# Patient Record
Sex: MALE | URBAN - METROPOLITAN AREA
[De-identification: names, ages, dates, MRNs, and addresses within clinical notes are randomized per-mention and may not be internally consistent; named-entity substitution may affect disease eponyms.]

---

## 2021-01-06 ENCOUNTER — INPATIENT (INPATIENT)
Facility: HOSPITAL | Age: 46
LOS: 0 days | Discharge: LEFT BEFORE DISCHARGE | DRG: 603 | End: 2021-01-07
Attending: STUDENT IN AN ORGANIZED HEALTH CARE EDUCATION/TRAINING PROGRAM | Admitting: STUDENT IN AN ORGANIZED HEALTH CARE EDUCATION/TRAINING PROGRAM
Payer: COMMERCIAL

## 2021-01-06 VITALS
HEART RATE: 75 BPM | SYSTOLIC BLOOD PRESSURE: 157 MMHG | OXYGEN SATURATION: 96 % | HEIGHT: 73 IN | DIASTOLIC BLOOD PRESSURE: 69 MMHG | TEMPERATURE: 99 F | RESPIRATION RATE: 18 BRPM | WEIGHT: 154.98 LBS

## 2021-01-06 VITALS — TEMPERATURE: 100 F

## 2021-01-06 DIAGNOSIS — L03.115 CELLULITIS OF RIGHT LOWER LIMB: ICD-10-CM

## 2021-01-06 DIAGNOSIS — F14.10 COCAINE ABUSE, UNCOMPLICATED: ICD-10-CM

## 2021-01-06 DIAGNOSIS — R63.8 OTHER SYMPTOMS AND SIGNS CONCERNING FOOD AND FLUID INTAKE: ICD-10-CM

## 2021-01-06 DIAGNOSIS — F25.9 SCHIZOAFFECTIVE DISORDER, UNSPECIFIED: ICD-10-CM

## 2021-01-06 DIAGNOSIS — F11.20 OPIOID DEPENDENCE, UNCOMPLICATED: ICD-10-CM

## 2021-01-06 DIAGNOSIS — F19.10 OTHER PSYCHOACTIVE SUBSTANCE ABUSE, UNCOMPLICATED: ICD-10-CM

## 2021-01-06 DIAGNOSIS — M86.371 CHRONIC MULTIFOCAL OSTEOMYELITIS, RIGHT ANKLE AND FOOT: ICD-10-CM

## 2021-01-06 LAB
ALBUMIN SERPL ELPH-MCNC: 3 G/DL — LOW (ref 3.3–5)
ALBUMIN SERPL ELPH-MCNC: 3.3 G/DL — SIGNIFICANT CHANGE UP (ref 3.3–5)
ALP SERPL-CCNC: 127 U/L — HIGH (ref 40–120)
ALP SERPL-CCNC: 134 U/L — HIGH (ref 40–120)
ALT FLD-CCNC: 86 U/L — HIGH (ref 10–45)
ALT FLD-CCNC: 91 U/L — HIGH (ref 10–45)
ANION GAP SERPL CALC-SCNC: 10 MMOL/L — SIGNIFICANT CHANGE UP (ref 5–17)
ANION GAP SERPL CALC-SCNC: 9 MMOL/L — SIGNIFICANT CHANGE UP (ref 5–17)
APPEARANCE UR: CLEAR — SIGNIFICANT CHANGE UP
APTT BLD: 39.2 SEC — HIGH (ref 27.5–35.5)
AST SERPL-CCNC: 72 U/L — HIGH (ref 10–40)
AST SERPL-CCNC: 74 U/L — HIGH (ref 10–40)
BASOPHILS # BLD AUTO: 0.04 K/UL — SIGNIFICANT CHANGE UP (ref 0–0.2)
BASOPHILS # BLD AUTO: 0.04 K/UL — SIGNIFICANT CHANGE UP (ref 0–0.2)
BASOPHILS NFR BLD AUTO: 0.4 % — SIGNIFICANT CHANGE UP (ref 0–2)
BASOPHILS NFR BLD AUTO: 0.5 % — SIGNIFICANT CHANGE UP (ref 0–2)
BILIRUB SERPL-MCNC: 0.6 MG/DL — SIGNIFICANT CHANGE UP (ref 0.2–1.2)
BILIRUB SERPL-MCNC: 0.8 MG/DL — SIGNIFICANT CHANGE UP (ref 0.2–1.2)
BILIRUB UR-MCNC: NEGATIVE — SIGNIFICANT CHANGE UP
BUN SERPL-MCNC: 10 MG/DL — SIGNIFICANT CHANGE UP (ref 7–23)
BUN SERPL-MCNC: 9 MG/DL — SIGNIFICANT CHANGE UP (ref 7–23)
CALCIUM SERPL-MCNC: 8.8 MG/DL — SIGNIFICANT CHANGE UP (ref 8.4–10.5)
CALCIUM SERPL-MCNC: 9 MG/DL — SIGNIFICANT CHANGE UP (ref 8.4–10.5)
CHLORIDE SERPL-SCNC: 92 MMOL/L — LOW (ref 96–108)
CHLORIDE SERPL-SCNC: 97 MMOL/L — SIGNIFICANT CHANGE UP (ref 96–108)
CO2 SERPL-SCNC: 29 MMOL/L — SIGNIFICANT CHANGE UP (ref 22–31)
CO2 SERPL-SCNC: 31 MMOL/L — SIGNIFICANT CHANGE UP (ref 22–31)
COLOR SPEC: YELLOW — SIGNIFICANT CHANGE UP
CREAT SERPL-MCNC: 0.8 MG/DL — SIGNIFICANT CHANGE UP (ref 0.5–1.3)
CREAT SERPL-MCNC: 0.81 MG/DL — SIGNIFICANT CHANGE UP (ref 0.5–1.3)
CRP SERPL-MCNC: 9.88 MG/DL — HIGH (ref 0–0.4)
DIFF PNL FLD: ABNORMAL
EOSINOPHIL # BLD AUTO: 0.15 K/UL — SIGNIFICANT CHANGE UP (ref 0–0.5)
EOSINOPHIL # BLD AUTO: 0.22 K/UL — SIGNIFICANT CHANGE UP (ref 0–0.5)
EOSINOPHIL NFR BLD AUTO: 1.9 % — SIGNIFICANT CHANGE UP (ref 0–6)
EOSINOPHIL NFR BLD AUTO: 2.4 % — SIGNIFICANT CHANGE UP (ref 0–6)
ERYTHROCYTE [SEDIMENTATION RATE] IN BLOOD: 89 MM/HR — HIGH
FERRITIN SERPL-MCNC: 101 NG/ML — SIGNIFICANT CHANGE UP (ref 30–400)
FOLATE SERPL-MCNC: 13.4 NG/ML — SIGNIFICANT CHANGE UP
GLUCOSE SERPL-MCNC: 79 MG/DL — SIGNIFICANT CHANGE UP (ref 70–99)
GLUCOSE SERPL-MCNC: 99 MG/DL — SIGNIFICANT CHANGE UP (ref 70–99)
GLUCOSE UR QL: NEGATIVE — SIGNIFICANT CHANGE UP
HAV IGM SER-ACNC: SIGNIFICANT CHANGE UP
HBV CORE IGM SER-ACNC: SIGNIFICANT CHANGE UP
HBV SURFACE AG SER-ACNC: SIGNIFICANT CHANGE UP
HCT VFR BLD CALC: 27.1 % — LOW (ref 39–50)
HCT VFR BLD CALC: 29 % — LOW (ref 39–50)
HCV AB S/CO SERPL IA: 19.88 S/CO — SIGNIFICANT CHANGE UP
HCV AB SERPL-IMP: REACTIVE
HGB BLD-MCNC: 8.3 G/DL — LOW (ref 13–17)
HGB BLD-MCNC: 8.8 G/DL — LOW (ref 13–17)
HIV 1+2 AB+HIV1 P24 AG SERPL QL IA: SIGNIFICANT CHANGE UP
HIV 1+2 AB+HIV1 P24 AG SERPL QL IA: SIGNIFICANT CHANGE UP
IMM GRANULOCYTES NFR BLD AUTO: 0.3 % — SIGNIFICANT CHANGE UP (ref 0–1.5)
IMM GRANULOCYTES NFR BLD AUTO: 0.4 % — SIGNIFICANT CHANGE UP (ref 0–1.5)
INR BLD: 1.22 — HIGH (ref 0.88–1.16)
IRON SATN MFR SERPL: 22 UG/DL — LOW (ref 45–165)
IRON SATN MFR SERPL: 8 % — LOW (ref 16–55)
KETONES UR-MCNC: NEGATIVE — SIGNIFICANT CHANGE UP
LACTATE SERPL-SCNC: 1.6 MMOL/L — SIGNIFICANT CHANGE UP (ref 0.5–2)
LEUKOCYTE ESTERASE UR-ACNC: NEGATIVE — SIGNIFICANT CHANGE UP
LYMPHOCYTES # BLD AUTO: 1.45 K/UL — SIGNIFICANT CHANGE UP (ref 1–3.3)
LYMPHOCYTES # BLD AUTO: 1.9 K/UL — SIGNIFICANT CHANGE UP (ref 1–3.3)
LYMPHOCYTES # BLD AUTO: 18.5 % — SIGNIFICANT CHANGE UP (ref 13–44)
LYMPHOCYTES # BLD AUTO: 20.7 % — SIGNIFICANT CHANGE UP (ref 13–44)
MAGNESIUM SERPL-MCNC: 1.5 MG/DL — LOW (ref 1.6–2.6)
MCHC RBC-ENTMCNC: 25 PG — LOW (ref 27–34)
MCHC RBC-ENTMCNC: 25.2 PG — LOW (ref 27–34)
MCHC RBC-ENTMCNC: 30.3 GM/DL — LOW (ref 32–36)
MCHC RBC-ENTMCNC: 30.6 GM/DL — LOW (ref 32–36)
MCV RBC AUTO: 82.4 FL — SIGNIFICANT CHANGE UP (ref 80–100)
MCV RBC AUTO: 82.4 FL — SIGNIFICANT CHANGE UP (ref 80–100)
MONOCYTES # BLD AUTO: 0.72 K/UL — SIGNIFICANT CHANGE UP (ref 0–0.9)
MONOCYTES # BLD AUTO: 1.11 K/UL — HIGH (ref 0–0.9)
MONOCYTES NFR BLD AUTO: 12.1 % — SIGNIFICANT CHANGE UP (ref 2–14)
MONOCYTES NFR BLD AUTO: 9.2 % — SIGNIFICANT CHANGE UP (ref 2–14)
NEUTROPHILS # BLD AUTO: 5.45 K/UL — SIGNIFICANT CHANGE UP (ref 1.8–7.4)
NEUTROPHILS # BLD AUTO: 5.86 K/UL — SIGNIFICANT CHANGE UP (ref 1.8–7.4)
NEUTROPHILS NFR BLD AUTO: 64.1 % — SIGNIFICANT CHANGE UP (ref 43–77)
NEUTROPHILS NFR BLD AUTO: 69.5 % — SIGNIFICANT CHANGE UP (ref 43–77)
NITRITE UR-MCNC: NEGATIVE — SIGNIFICANT CHANGE UP
NRBC # BLD: 0 /100 WBCS — SIGNIFICANT CHANGE UP (ref 0–0)
NRBC # BLD: 0 /100 WBCS — SIGNIFICANT CHANGE UP (ref 0–0)
PH UR: 6.5 — SIGNIFICANT CHANGE UP (ref 5–8)
PHOSPHATE SERPL-MCNC: 3.4 MG/DL — SIGNIFICANT CHANGE UP (ref 2.5–4.5)
PLATELET # BLD AUTO: 398 K/UL — SIGNIFICANT CHANGE UP (ref 150–400)
PLATELET # BLD AUTO: 468 K/UL — HIGH (ref 150–400)
POTASSIUM SERPL-MCNC: 3.6 MMOL/L — SIGNIFICANT CHANGE UP (ref 3.5–5.3)
POTASSIUM SERPL-MCNC: 3.9 MMOL/L — SIGNIFICANT CHANGE UP (ref 3.5–5.3)
POTASSIUM SERPL-SCNC: 3.6 MMOL/L — SIGNIFICANT CHANGE UP (ref 3.5–5.3)
POTASSIUM SERPL-SCNC: 3.9 MMOL/L — SIGNIFICANT CHANGE UP (ref 3.5–5.3)
PROT SERPL-MCNC: 9.1 G/DL — HIGH (ref 6–8.3)
PROT SERPL-MCNC: 9.1 G/DL — HIGH (ref 6–8.3)
PROT UR-MCNC: ABNORMAL MG/DL
PROTHROM AB SERPL-ACNC: 14.5 SEC — HIGH (ref 10.6–13.6)
RBC # BLD: 3.29 M/UL — LOW (ref 4.2–5.8)
RBC # BLD: 3.52 M/UL — LOW (ref 4.2–5.8)
RBC # FLD: 18.6 % — HIGH (ref 10.3–14.5)
RBC # FLD: 18.7 % — HIGH (ref 10.3–14.5)
SARS-COV-2 RNA SPEC QL NAA+PROBE: SIGNIFICANT CHANGE UP
SODIUM SERPL-SCNC: 133 MMOL/L — LOW (ref 135–145)
SODIUM SERPL-SCNC: 135 MMOL/L — SIGNIFICANT CHANGE UP (ref 135–145)
SP GR SPEC: 1.02 — SIGNIFICANT CHANGE UP (ref 1–1.03)
TIBC SERPL-MCNC: 291 UG/DL — SIGNIFICANT CHANGE UP (ref 220–430)
UIBC SERPL-MCNC: 269 UG/DL — SIGNIFICANT CHANGE UP (ref 110–370)
UROBILINOGEN FLD QL: 0.2 E.U./DL — SIGNIFICANT CHANGE UP
VIT B12 SERPL-MCNC: 586 PG/ML — SIGNIFICANT CHANGE UP (ref 232–1245)
WBC # BLD: 7.84 K/UL — SIGNIFICANT CHANGE UP (ref 3.8–10.5)
WBC # BLD: 9.16 K/UL — SIGNIFICANT CHANGE UP (ref 3.8–10.5)
WBC # FLD AUTO: 7.84 K/UL — SIGNIFICANT CHANGE UP (ref 3.8–10.5)
WBC # FLD AUTO: 9.16 K/UL — SIGNIFICANT CHANGE UP (ref 3.8–10.5)

## 2021-01-06 PROCEDURE — 73590 X-RAY EXAM OF LOWER LEG: CPT | Mod: 26,RT

## 2021-01-06 PROCEDURE — 93010 ELECTROCARDIOGRAM REPORT: CPT

## 2021-01-06 PROCEDURE — 73630 X-RAY EXAM OF FOOT: CPT | Mod: 26,RT,77

## 2021-01-06 PROCEDURE — 93970 EXTREMITY STUDY: CPT | Mod: 26

## 2021-01-06 PROCEDURE — 73610 X-RAY EXAM OF ANKLE: CPT | Mod: 26,RT

## 2021-01-06 PROCEDURE — 99285 EMERGENCY DEPT VISIT HI MDM: CPT

## 2021-01-06 PROCEDURE — 71045 X-RAY EXAM CHEST 1 VIEW: CPT | Mod: 26

## 2021-01-06 PROCEDURE — 99221 1ST HOSP IP/OBS SF/LOW 40: CPT

## 2021-01-06 PROCEDURE — 73630 X-RAY EXAM OF FOOT: CPT | Mod: 26,LT

## 2021-01-06 PROCEDURE — 99223 1ST HOSP IP/OBS HIGH 75: CPT | Mod: GC

## 2021-01-06 PROCEDURE — 99223 1ST HOSP IP/OBS HIGH 75: CPT

## 2021-01-06 RX ORDER — ACETAMINOPHEN 500 MG
1000 TABLET ORAL ONCE
Refills: 0 | Status: COMPLETED | OUTPATIENT
Start: 2021-01-06 | End: 2021-01-06

## 2021-01-06 RX ORDER — LAMOTRIGINE 25 MG/1
50 TABLET, ORALLY DISINTEGRATING ORAL DAILY
Refills: 0 | Status: DISCONTINUED | OUTPATIENT
Start: 2021-01-07 | End: 2021-01-07

## 2021-01-06 RX ORDER — MIRTAZAPINE 45 MG/1
15 TABLET, ORALLY DISINTEGRATING ORAL AT BEDTIME
Refills: 0 | Status: DISCONTINUED | OUTPATIENT
Start: 2021-01-06 | End: 2021-01-07

## 2021-01-06 RX ORDER — VANCOMYCIN HCL 1 G
1250 VIAL (EA) INTRAVENOUS ONCE
Refills: 0 | Status: COMPLETED | OUTPATIENT
Start: 2021-01-06 | End: 2021-01-06

## 2021-01-06 RX ORDER — CEFEPIME 1 G/1
2000 INJECTION, POWDER, FOR SOLUTION INTRAMUSCULAR; INTRAVENOUS EVERY 8 HOURS
Refills: 0 | Status: DISCONTINUED | OUTPATIENT
Start: 2021-01-06 | End: 2021-01-06

## 2021-01-06 RX ORDER — ENOXAPARIN SODIUM 100 MG/ML
40 INJECTION SUBCUTANEOUS EVERY 24 HOURS
Refills: 0 | Status: DISCONTINUED | OUTPATIENT
Start: 2021-01-06 | End: 2021-01-07

## 2021-01-06 RX ORDER — GABAPENTIN 400 MG/1
300 CAPSULE ORAL DAILY
Refills: 0 | Status: DISCONTINUED | OUTPATIENT
Start: 2021-01-06 | End: 2021-01-07

## 2021-01-06 RX ORDER — VANCOMYCIN HCL 1 G
1250 VIAL (EA) INTRAVENOUS EVERY 12 HOURS
Refills: 0 | Status: DISCONTINUED | OUTPATIENT
Start: 2021-01-06 | End: 2021-01-06

## 2021-01-06 RX ORDER — CEFEPIME 1 G/1
2000 INJECTION, POWDER, FOR SOLUTION INTRAMUSCULAR; INTRAVENOUS ONCE
Refills: 0 | Status: COMPLETED | OUTPATIENT
Start: 2021-01-06 | End: 2021-01-06

## 2021-01-06 RX ORDER — KETOROLAC TROMETHAMINE 30 MG/ML
30 SYRINGE (ML) INJECTION ONCE
Refills: 0 | Status: DISCONTINUED | OUTPATIENT
Start: 2021-01-06 | End: 2021-01-06

## 2021-01-06 RX ORDER — CEFAZOLIN SODIUM 1 G
2000 VIAL (EA) INJECTION EVERY 8 HOURS
Refills: 0 | Status: DISCONTINUED | OUTPATIENT
Start: 2021-01-06 | End: 2021-01-07

## 2021-01-06 RX ORDER — ACETAMINOPHEN 500 MG
650 TABLET ORAL EVERY 6 HOURS
Refills: 0 | Status: DISCONTINUED | OUTPATIENT
Start: 2021-01-06 | End: 2021-01-07

## 2021-01-06 RX ORDER — BUPROPION HYDROCHLORIDE 150 MG/1
150 TABLET, EXTENDED RELEASE ORAL DAILY
Refills: 0 | Status: DISCONTINUED | OUTPATIENT
Start: 2021-01-06 | End: 2021-01-07

## 2021-01-06 RX ORDER — LAMOTRIGINE 25 MG/1
25 TABLET, ORALLY DISINTEGRATING ORAL DAILY
Refills: 0 | Status: DISCONTINUED | OUTPATIENT
Start: 2021-01-06 | End: 2021-01-06

## 2021-01-06 RX ORDER — VANCOMYCIN HCL 1 G
1250 VIAL (EA) INTRAVENOUS EVERY 8 HOURS
Refills: 0 | Status: DISCONTINUED | OUTPATIENT
Start: 2021-01-06 | End: 2021-01-06

## 2021-01-06 RX ADMIN — ENOXAPARIN SODIUM 40 MILLIGRAM(S): 100 INJECTION SUBCUTANEOUS at 09:46

## 2021-01-06 RX ADMIN — Medication 1000 MILLIGRAM(S): at 02:40

## 2021-01-06 RX ADMIN — Medication 1250 MILLIGRAM(S): at 05:00

## 2021-01-06 RX ADMIN — Medication 100 MILLIGRAM(S): at 21:42

## 2021-01-06 RX ADMIN — CEFEPIME 2000 MILLIGRAM(S): 1 INJECTION, POWDER, FOR SOLUTION INTRAMUSCULAR; INTRAVENOUS at 05:43

## 2021-01-06 RX ADMIN — LAMOTRIGINE 25 MILLIGRAM(S): 25 TABLET, ORALLY DISINTEGRATING ORAL at 13:21

## 2021-01-06 RX ADMIN — Medication 1000 MILLIGRAM(S): at 03:16

## 2021-01-06 RX ADMIN — MIRTAZAPINE 15 MILLIGRAM(S): 45 TABLET, ORALLY DISINTEGRATING ORAL at 21:42

## 2021-01-06 RX ADMIN — Medication 30 MILLIGRAM(S): at 03:16

## 2021-01-06 RX ADMIN — CEFEPIME 100 MILLIGRAM(S): 1 INJECTION, POWDER, FOR SOLUTION INTRAMUSCULAR; INTRAVENOUS at 04:52

## 2021-01-06 RX ADMIN — Medication 400 MILLIGRAM(S): at 01:37

## 2021-01-06 RX ADMIN — Medication 166.67 MILLIGRAM(S): at 13:21

## 2021-01-06 RX ADMIN — BUPROPION HYDROCHLORIDE 150 MILLIGRAM(S): 150 TABLET, EXTENDED RELEASE ORAL at 13:21

## 2021-01-06 RX ADMIN — Medication 650 MILLIGRAM(S): at 21:42

## 2021-01-06 RX ADMIN — GABAPENTIN 300 MILLIGRAM(S): 400 CAPSULE ORAL at 13:21

## 2021-01-06 RX ADMIN — Medication 166.67 MILLIGRAM(S): at 03:16

## 2021-01-06 RX ADMIN — Medication 30 MILLIGRAM(S): at 01:37

## 2021-01-06 RX ADMIN — CEFEPIME 100 MILLIGRAM(S): 1 INJECTION, POWDER, FOR SOLUTION INTRAMUSCULAR; INTRAVENOUS at 13:21

## 2021-01-06 RX ADMIN — Medication 0.1 MILLIGRAM(S): at 17:47

## 2021-01-06 NOTE — CHART NOTE - NSCHARTNOTEFT_GEN_A_CORE
Infectious Diseases Anti-infective Approval Note    Medication: cefepime  Dose: 2g  Route: IV  Frequency: q8h  Duration: 3 days    *THIS IS NOT AN INFECTIOUS DISEASES CONSULTATION*

## 2021-01-06 NOTE — H&P ADULT - PROBLEM SELECTOR PLAN 3
Current IVDA. Last used IV heroine and cocaine yesterday. Uses same distributor, obtains sterile needles, does not share needles.

## 2021-01-06 NOTE — ED ADULT NURSE NOTE - NSIMPLEMENTINTERV_GEN_ALL_ED
Implemented All Fall Risk Interventions:  [place] to call system. Call bell, personal items and telephone within reach. Instruct patient to call for assistance. Room bathroom lighting operational. Non-slip footwear when patient is off stretcher. Physically safe environment: no spills, clutter or unnecessary equipment. Stretcher in lowest position, wheels locked, appropriate side rails in place. Provide visual cue, wrist band, yellow gown, etc. Monitor gait and stability. Monitor for mental status changes and reorient to person, place, and time. Review medications for side effects contributing to fall risk. Reinforce activity limits and safety measures with patient and family.

## 2021-01-06 NOTE — H&P ADULT - NSHPSOCIALHISTORY_GEN_ALL_CORE
Living situation:  Occupation:    Tobacco use:   EtOH use:  Illicit drug use:    Sexual History: Living situation: lives in apartment alone  Occupation: unemployed due to osteo (previously worked as [employer])    Tobacco use: 2-5 cigarettes per day  EtOH use: none  Illicit drug use: IVDA, cocaine and heroine daily use

## 2021-01-06 NOTE — H&P ADULT - NSHPLABSRESULTS_GEN_ALL_CORE
LABS:                        8.3    7.84  )-----------( 398      ( 2021 01:22 )             27.1         133<L>  |  92<L>  |  10  ----------------------------<  99  3.6   |  31  |  0.81    Ca    9.0      2021 01:22    TPro  9.1<H>  /  Alb  3.3  /  TBili  0.6  /  DBili  x   /  AST  72<H>  /  ALT  91<H>  /  AlkPhos  134<H>        PT/INR - ( 2021 01:22 )   PT: 14.5 sec;   INR: 1.22     PTT - ( 2021 01:22 )  PTT:39.2 sec    Urinalysis Basic - ( 2021 03:13 )  Color: Yellow / Appearance: Clear / S.025 / pH: x  Gluc: x / Ketone: NEGATIVE  / Bili: Negative / Urobili: 0.2 E.U./dL   Blood: x / Protein: Trace mg/dL / Nitrite: NEGATIVE   Leuk Esterase: NEGATIVE / RBC: 5-10 /HPF / WBC < 5 /HPF   Sq Epi: x / Non Sq Epi: 0-5 /HPF / Bacteria: Present /HPF     RADIOLOGY, EKG AND ADDITIONAL TESTS: Reviewed. LABS:                        8.3    7.84  )-----------( 398      ( 2021 01:22 )             27.1         133<L>  |  92<L>  |  10  ----------------------------<  99  3.6   |  31  |  0.81    Ca    9.0      2021 01:22    TPro  9.1<H>  /  Alb  3.3  /  TBili  0.6  /  DBili  x   /  AST  72<H>  /  ALT  91<H>  /  AlkPhos  134<H>        PT/INR - ( 2021 01:22 )   PT: 14.5 sec;   INR: 1.22     PTT - ( 2021 01:22 )  PTT:39.2 sec    Urinalysis Basic - ( 2021 03:13 )  Color: Yellow / Appearance: Clear / S.025 / pH: x  Gluc: x / Ketone: NEGATIVE  / Bili: Negative / Urobili: 0.2 E.U./dL   Blood: x / Protein: Trace mg/dL / Nitrite: NEGATIVE   Leuk Esterase: NEGATIVE / RBC: 5-10 /HPF / WBC < 5 /HPF   Sq Epi: x / Non Sq Epi: 0-5 /HPF / Bacteria: Present /HPF     RADIOLOGY, EKG AND ADDITIONAL TESTS: Reviewed.  < from: Xray Chest 1 View AP/PA (21 @ 04:56) >  Findings/impression: Heart, lungs, mediastinum and thorax are unremarkable.    < from: Xray Tibia + Fibula 2 Views, Right (21 @ 04:55) >  Findings/impression: Tibia and fibula are unremarkable. Ankle and foot soft tissue swelling, ankle joint effusion, talar pathological fracture/osteomyelitis    < from: Xray Foot AP + Lateral + Oblique, Right (21 @ 04:55) >  Findings/impression: Diffuse soft tissue swelling, demineralization, anklejoint effusion.. Talar pathological fracture/osteomyelitis.    < from: US Duplex Venous Lower Ext Complete, Bilateral (21 @ 02:51) >  FINDINGS:     Thigh veins: The common femoral, femoral, popliteal, proximal greater saphenous, and proximal deep femoral veins are patent and free of thrombus bilaterally. The veins are normally compressible and have normal phasic flow and augmentation response.     Calf veins: The paired peroneal and posterior tibial calf veins are patent bilaterally.     Other: There is bilateral lower extremity subcutaneous edema, right worse than left. Multiple mildly prominent lymph nodes are seen in the right groin measuring up to 1.4 cm, nonspecific.  IMPRESSION: No deep vein thrombosis seen.

## 2021-01-06 NOTE — CONSULT NOTE ADULT - SUBJECTIVE AND OBJECTIVE BOX
Orthopaedic Surgery Consult Note    For Surgeon: Dr. Ma       HPI:  History obtained from chart and pt as pt poor historian.     45m PMHx of schizoaffective disorder, IVDA, and hx of R LE cellulitis & osteomyelitis, presented to [facility] with worsening pain and swelling of R leg in the setting of recent I&D for abscess/osteomyelitis at an outside institution w nonadherence to oral abx .   As Per prior notes pt with a recent admission to [facility] 12/11/20-12/16/20 with foot abscess/osteo, s/p I&D 12/11  however pt states this was sevral months ago/going on for several months???. As per charts, Initially on vancomycin for S. aureus then switched to clindamycin after sensitivities resulted. Pt then signed out AMA and was discharged on doxycycline, however he stopped 4d into course due to diarrhea - however he states he has been on clinda and has been compliant however difficult to asses truth of story due to lethargy while talking/examining pt.     pt endorses continued IVDU. Last used IV heroine and cocaine yesterday.    An outpatient  MR showing osteo involving the distal tibia, distal fibula, talus, articular aspect and anterior third of the calcaneum, navicular, articular aspects of the cuboid, medial, intermediate and lateral cuneiforms, with pathological fracture about the talar dome, and large joint effusion with enhancing peripheral synovitis.    Today   In the ED,  Vitals: T98.9, HR 75, /69, RR 18, O2sat 96%  Labs: Hb 8.3 | Na 133, Cl 92, total protein 9.1 | alk phos 134, AST 72, ALT 91 | ESR 89, CRP 9.88 | lactate 1.6    UA: large blood, RBC 5-10  Imaging:    CXR: Heart, lungs, mediastinum and thorax are unremarkable.    XR Tibia + Fibula: Tibia and fibula are unremarkable. Ankle and foot soft tissue swelling, ankle joint effusion, talar pathological fracture/osteomyelitis    XR Foot AP + Lateral + Oblique, Right: Diffuse soft tissue swelling, demineralization, anklejoint effusion.. Talar pathological fracture/osteomyelitis.    US Duplex Venous Lower Ext Complete, Bilateral: neg for DVT  Interventions: given Tylenol 1g qd, toradol 30mg, vancomycin 1250mg, cefepime 2g (06 Jan 2021 05:34)      Allergies    Haldol (Unknown)  penicillin (Unknown)  Seafood (Unknown)    Intolerances      PAST MEDICAL & SURGICAL HISTORY:  IV drug abuse    Schizoaffective disorder    No significant past surgical history      MEDICATIONS  (STANDING):  buPROPion XL . 150 milliGRAM(s) Oral daily  ceFAZolin   IVPB 2000 milliGRAM(s) IV Intermittent every 8 hours  cloNIDine 0.1 milliGRAM(s) Oral every 12 hours  enoxaparin Injectable 40 milliGRAM(s) SubCutaneous every 24 hours  gabapentin 300 milliGRAM(s) Oral daily  mirtazapine 15 milliGRAM(s) Oral at bedtime    MEDICATIONS  (PRN):  acetaminophen   Tablet .. 650 milliGRAM(s) Oral every 6 hours PRN Temp greater or equal to 38C (100.4F), Mild Pain (1 - 3)  cloNIDine 0.1 milliGRAM(s) Oral once PRN withdrawal symptoms      Vital Signs Last 24 Hrs  T(C): 37.6 (06 Jan 2021 13:35), Max: 37.6 (06 Jan 2021 13:35)  T(F): 99.7 (06 Jan 2021 13:35), Max: 99.7 (06 Jan 2021 13:35)  HR: 89 (06 Jan 2021 13:35) (75 - 89)  BP: 112/64 (06 Jan 2021 13:35) (112/64 - 157/69)  BP(mean): --  RR: 17 (06 Jan 2021 13:35) (15 - 18)  SpO2: 98% (06 Jan 2021 13:35) (96% - 99%)    Physical Exam:                          8.8    9.16  )-----------( 468      ( 06 Jan 2021 11:10 )             29.0     01-06    135  |  97  |  9   ----------------------------<  79  3.9   |  29  |  0.80    Ca    8.8      06 Jan 2021 11:10  Phos  3.4     01-06  Mg     1.5     01-06    TPro  9.1<H>  /  Alb  3.0<L>  /  TBili  0.8  /  DBili  x   /  AST  74<H>  /  ALT  86<H>  /  AlkPhos  127<H>  01-06    PT/INR - ( 06 Jan 2021 01:22 )   PT: 14.5 sec;   INR: 1.22          PTT - ( 06 Jan 2021 01:22 )  PTT:39.2 sec  Imaging:   XR showing significant soft tissue swelling   acute on chronic changes over multiple bones in the foot an ankle possible representing osteomyelitis      A/P: 45yMale w a complex history as well as noted/documented non-compliance with treatment presenting after R ankle/foot I&D at an outside hospital w continued pain, swelling and erythema non-compliant w prescribed abx admitted to medicine for cellulitis. Podiatry and ID on following recommending MR of ankle/foot to evaluate soft tissues and bones further as well as possible abscess. Agree with podiatry, will follow up MR. Due to non-septic/unstable status of patient, Erythema throughout region where aspiration would occur and abx treatment aspiration will be deferred pending MR results. Will continue to follow, Podiatry recs appropriate. If any changes in status of pt please notify ortho.   - No acute orthopaedic intervention at this point  - Podiatry recs appropriate at this time, will follow behind podiatry   - ID recs for Abx  - MR w and w/o contrast of distal leg, ankle and foot.   - pain control  - rest of care as per primary   - will re-eval for aspiration pending MR, or change in status.   - please notify ortho with any new information/ change in status     -Discussed with Dr. Ma     Ortho Pager [phone]   Orthopaedic Surgery Consult Note    For Surgeon: Dr. Ma       HPI:  History obtained from chart and pt as pt poor historian.     45m PMHx of schizoaffective disorder, IVDA, and hx of R LE cellulitis & osteomyelitis, presented to [facility] with worsening pain and swelling of R leg in the setting of recent I&D for abscess/osteomyelitis at an outside institution w nonadherence to oral abx .   As Per prior notes pt with a recent admission to [facility] 12/11/20-12/16/20 with foot abscess/osteo, s/p I&D 12/11  however pt states this was sevral months ago/going on for several months???. As per charts, Initially on vancomycin for S. aureus then switched to clindamycin after sensitivities resulted. Pt then signed out AMA and was discharged on doxycycline, however he stopped 4d into course due to diarrhea - however he states he has been on clinda and has been compliant however difficult to asses truth of story due to lethargy while talking/examining pt.     pt endorses continued IVDU. Last used IV heroine and cocaine yesterday.    An outpatient  MR showing osteo involving the distal tibia, distal fibula, talus, articular aspect and anterior third of the calcaneum, navicular, articular aspects of the cuboid, medial, intermediate and lateral cuneiforms, with pathological fracture about the talar dome, and large joint effusion with enhancing peripheral synovitis.    Today   In the ED,  Vitals: T98.9, HR 75, /69, RR 18, O2sat 96%  Labs: Hb 8.3 | Na 133, Cl 92, total protein 9.1 | alk phos 134, AST 72, ALT 91 | ESR 89, CRP 9.88 | lactate 1.6    UA: large blood, RBC 5-10  Imaging:    CXR: Heart, lungs, mediastinum and thorax are unremarkable.    XR Tibia + Fibula: Tibia and fibula are unremarkable. Ankle and foot soft tissue swelling, ankle joint effusion, talar pathological fracture/osteomyelitis    XR Foot AP + Lateral + Oblique, Right: Diffuse soft tissue swelling, demineralization, anklejoint effusion.. Talar pathological fracture/osteomyelitis.    US Duplex Venous Lower Ext Complete, Bilateral: neg for DVT  Interventions: given Tylenol 1g qd, toradol 30mg, vancomycin 1250mg, cefepime 2g (06 Jan 2021 05:34)      Allergies    Haldol (Unknown)  penicillin (Unknown)  Seafood (Unknown)    Intolerances      PAST MEDICAL & SURGICAL HISTORY:  IV drug abuse    Schizoaffective disorder    No significant past surgical history      MEDICATIONS  (STANDING):  buPROPion XL . 150 milliGRAM(s) Oral daily  ceFAZolin   IVPB 2000 milliGRAM(s) IV Intermittent every 8 hours  cloNIDine 0.1 milliGRAM(s) Oral every 12 hours  enoxaparin Injectable 40 milliGRAM(s) SubCutaneous every 24 hours  gabapentin 300 milliGRAM(s) Oral daily  mirtazapine 15 milliGRAM(s) Oral at bedtime    MEDICATIONS  (PRN):  acetaminophen   Tablet .. 650 milliGRAM(s) Oral every 6 hours PRN Temp greater or equal to 38C (100.4F), Mild Pain (1 - 3)  cloNIDine 0.1 milliGRAM(s) Oral once PRN withdrawal symptoms      Vital Signs Last 24 Hrs  T(C): 37.6 (06 Jan 2021 13:35), Max: 37.6 (06 Jan 2021 13:35)  T(F): 99.7 (06 Jan 2021 13:35), Max: 99.7 (06 Jan 2021 13:35)  HR: 89 (06 Jan 2021 13:35) (75 - 89)  BP: 112/64 (06 Jan 2021 13:35) (112/64 - 157/69)  BP(mean): --  RR: 17 (06 Jan 2021 13:35) (15 - 18)  SpO2: 98% (06 Jan 2021 13:35) (96% - 99%)    Physical Exam:  general: lethargic       exam limited due to pt not allowing motion     RLE:   swelling   erythema   no open wounds, no drainage over old lateral incision   SILT   2+ Cap refill  firing EHl/FHL/TA/GS                      8.8    9.16  )-----------( 468      ( 06 Jan 2021 11:10 )             29.0     01-06    135  |  97  |  9   ----------------------------<  79  3.9   |  29  |  0.80    Ca    8.8      06 Jan 2021 11:10  Phos  3.4     01-06  Mg     1.5     01-06    TPro  9.1<H>  /  Alb  3.0<L>  /  TBili  0.8  /  DBili  x   /  AST  74<H>  /  ALT  86<H>  /  AlkPhos  127<H>  01-06    PT/INR - ( 06 Jan 2021 01:22 )   PT: 14.5 sec;   INR: 1.22          PTT - ( 06 Jan 2021 01:22 )  PTT:39.2 sec  Imaging:   XR showing significant soft tissue swelling   acute on chronic changes over multiple bones in the foot an ankle possible representing osteomyelitis      A/P: 45yMale w a complex history as well as noted/documented non-compliance with treatment presenting after R ankle/foot I&D at an outside hospital w continued pain, swelling and erythema non-compliant w prescribed abx admitted to medicine for cellulitis. Podiatry and ID on following recommending MR of ankle/foot to evaluate soft tissues and bones further as well as possible abscess. Agree with podiatry, will follow up MR. Due to non-septic/unstable status of patient, Erythema throughout region where aspiration would occur and abx treatment aspiration will be deferred pending MR results. Will continue to follow, Podiatry recs appropriate. If any changes in status of pt please notify ortho.   - No acute orthopaedic intervention at this point  - Podiatry recs appropriate at this time, will follow behind podiatry   - ID recs for Abx  - MR w and w/o contrast of distal leg, ankle and foot.   - pain control  - rest of care as per primary   - will re-eval for aspiration pending MR, or change in status.   - please notify ortho with any new information/ change in status     -Discussed with Dr. Ma     Ortho Pager [phone]

## 2021-01-06 NOTE — H&P ADULT - ATTENDING COMMENTS
45 year old man with osteomyelitis of the right foot (diagnosed while at [facility] last month), now presenting with pain and swelling of the right foot.     # Multifocal osteomyelitis of right leg  # Cellulitis of right lower limb  MRI from [facility] stay (12/11-12/16) showing: osteomyelitis of the distal tibia, distal fibula, talus, articular aspect and anterior third of the calcaneum, navicular, articular aspects of the cuboid, medial, intermediate and lateral cuneiform  Swelling and erythema of right foot and ankle, consistent with cellulitis.   Started on abx in the ED  Agree with ID recs for Cefazolin based on sensitivities from patient’s 12/11-12/16 hospitalization at [facility]. If does not respond, may have to broaden, given that it has been >2 weeks since he left [facility]  -f/u MRI, podiatry recs, ID recs  Has effusion and swelling of the right ankle, with very limited range of motion.   [ ] ortho to evaluate for possible septic arthritis.     # Left leg edema  Swelling without erythema. Patient attributes this to increased pressure and weight on left foot while using crutches. pain in midfoot with point tenderness in dorsum of foot around the navicular bone  [ ] f/u foot and ankle x-rays    # Opioid dependence   # Cocaine dependence  Last used heroin and cocaine the day before admission. Almost daily use of both  Used suboxone before but did not like taste; re-visit willingness to restart suboxone when patient is more alert.     # Schizoaffective disorder  Home meds per psych recs

## 2021-01-06 NOTE — ED PROVIDER NOTE - CONSTITUTIONAL, MLM
ill appearing, awake, alert, oriented to person, place, time/situation and appears uncomfortable normal...

## 2021-01-06 NOTE — ED PROVIDER NOTE - CLINICAL SUMMARY MEDICAL DECISION MAKING FREE TEXT BOX
has discharge papers from outside hospital detailing recent admission for cellulitis/osteomyelitis of right lower extremity with incomplete treatment, signed out ama, didn't followup. now with persistent worsening pain/swelling. appears infected. labs/cultures obtained. xrays done showing bony destruction of ankle/foot. started cefepimem/vancomycin for suspected osteo/cellulitis. duplex done and neg for dvt. toradol/tylenol for pain. podiatry consult. admit for further management

## 2021-01-06 NOTE — CONSULT NOTE ADULT - SUBJECTIVE AND OBJECTIVE BOX
Attending: Adalbertonancy    Patient is a 45y old  Male who presents with a chief complaint of RLE osteomyelitis (06 Jan 2021 08:09)    HPI:  Mr. Ty is a 45M with schizoaffective disorder, IVDA, and hx of R LE cellulitis & osteomyelitis, who presents to [facility] for worsening pain and swelling of R leg.     Pt with recent admission to [facility] 12/11/20-12/16/20 with foot abscess/osteo, s/p I&D 12/11. MRI w/ osteo involving the distal tibia, distal fibula, talus, articular aspect and anterior third of the calcaneum, navicular, articular aspects of the cuboid, medial, intermediate and lateral cuneiforms, with pathological fracture about the talar dome, and large joint effusion with enhancing peripheral synovitis. Initially on vancomycin for S. aureus, switched to clindamycin after sensitivities resulted. Pt signed out AMA and was discharged on doxycycline, however he stopped 4d into course due to diarrhea. Pt was supposed to follow up 12/29 but didn't arrive at appointment.    Today he reports coming in due to worsening pain and swelling of both feet. He reports that the swelling worsened in the L foot due to increased pressure and weight on that side while using crutches.    Of note, pt with current IVDA. Last used IV heroine and cocaine yesterday. Uses same distributor, obtains sterile needles, does not share needles.    Today   In the ED,  Vitals: T98.9, HR 75, /69, RR 18, O2sat 96%  Labs: Hb 8.3 | Na 133, Cl 92, total protein 9.1 | alk phos 134, AST 72, ALT 91 | ESR 89, CRP 9.88 | lactate 1.6    UA: large blood, RBC 5-10  Imaging:    CXR: Heart, lungs, mediastinum and thorax are unremarkable.    XR Tibia + Fibula: Tibia and fibula are unremarkable. Ankle and foot soft tissue swelling, ankle joint effusion, talar pathological fracture/osteomyelitis    XR Foot AP + Lateral + Oblique, Right: Diffuse soft tissue swelling, demineralization, anklejoint effusion.. Talar pathological fracture/osteomyelitis.    US Duplex Venous Lower Ext Complete, Bilateral: neg for DVT  Interventions: given Tylenol 1g qd, toradol 30mg, vancomycin 1250mg, cefepime 2g (06 Jan 2021 05:34)    Review of systems negative except per HPI    PAST MEDICAL & SURGICAL HISTORY:  IV drug abuse    Schizoaffective disorder    No significant past surgical history      Home Medications:    Allergies    Haldol (Unknown)  penicillin (Unknown)  Seafood (Unknown)    Intolerances      FAMILY HISTORY:    Social History:       LABS                        8.3    7.84  )-----------( 398      ( 06 Jan 2021 01:22 )             27.1     01-06    133<L>  |  92<L>  |  10  ----------------------------<  99  3.6   |  31  |  0.81    Ca    9.0      06 Jan 2021 01:22    TPro  9.1<H>  /  Alb  3.3  /  TBili  0.6  /  DBili  x   /  AST  72<H>  /  ALT  91<H>  /  AlkPhos  134<H>  01-06    PT/INR - ( 06 Jan 2021 01:22 )   PT: 14.5 sec;   INR: 1.22          PTT - ( 06 Jan 2021 01:22 )  PTT:39.2 sec  ESR: 89  CRP: --  01-06 @ 01:22    Vital Signs Last 24 Hrs  T(C): 37 (06 Jan 2021 07:20), Max: 37.2 (06 Jan 2021 00:05)  T(F): 98.6 (06 Jan 2021 07:20), Max: 98.9 (06 Jan 2021 00:05)  HR: 80 (06 Jan 2021 07:20) (75 - 89)  BP: 121/55 (06 Jan 2021 07:20) (114/64 - 157/69)  BP(mean): --  RR: 18 (06 Jan 2021 07:20) (15 - 18)  SpO2: 99% (06 Jan 2021 07:20) (96% - 99%)    PHYSICAL EXAM  General: NAD, AA0x3    Lower Extremity Focused:  Vasc: DP/PT 2/4 B/L  Derm: R lateral ankle wound (1x1cm). No purulence or palpable fluctuance noted. Significant edema of RLE>>LLE.  Neuro: Sensation grossly intact    RADIOLOGY  < from: Xray Tibia + Fibula 2 Views, Right (01.06.21 @ 04:55) >  Findings/  impression: Tibia and fibula are unremarkable. Ankle and foot soft tissue swelling, ankle joint effusion, talar pathological fracture/osteomyelitis    < end of copied text >    < from: Xray Foot AP + Lateral + Oblique, Right (01.06.21 @ 04:55) >  Findings/  impression: Diffuse soft tissue swelling, demineralization, anklejoint effusion.. Talar pathological fracture/osteomyelitis.    < end of copied text >

## 2021-01-06 NOTE — ED PROVIDER NOTE - OBJECTIVE STATEMENT
history of schizoaffective disorder, ivda, right leg cellulitis/abscess/osteomyelitis, here with increased pain/swelling of right leg/foot. Was reportedly admitted at [facility] 12/11/20-12/16/20 with foot abscess/osteo. Had I and d done on 12/11, started vancomycin, switched to clindamycin for S. Aureus infection. MRI done showing osteo of distal tibia/fibula/talus/foot bones. Per papers, pt then signed out ama. Was discharged on doxycycline but he says he was taking Clindamycin po. Not tolerating due to diarrhea. Supposed to followup 12/29 but didn't. history of schizoaffective disorder, ivda, right leg cellulitis/abscess/osteomyelitis, here with increased pain/swelling of right leg/foot. Was reportedly admitted at [facility] 12/11/20-12/16/20 with foot abscess/osteo. Had I and d done on 12/11, started vancomycin, switched to clindamycin for S. Aureus infection. MRI done showing osteo of distal tibia/fibula/talus/foot bones. Per papers, pt then signed out ama. Was discharged on doxycycline but he says he was taking Clindamycin po. Not tolerating due to diarrhea so he stopped after a few days and hasn't taken anything in a few weeks. No diarrhea in past few weeks. Supposed to followup 12/29 but didn't. Last used iv drug yesterday.

## 2021-01-06 NOTE — BEHAVIORAL HEALTH ASSESSMENT NOTE - SUMMARY
45M with PPH of mood disorder vs schizoaffective disorder and chronic IVDA (heroin. cocaine), and PMH of R LE cellulitis & osteomyelitis, who presents to [facility] for worsening pain and swelling of R leg, admitted for acute-on-chronic multifocal osteomyelitis and worsening cellulitis. Psychiatry was consulted to assist with providing psychoeducation re the benefits of taking suboxone. Patient currently presents sedated and unable/unwilling to engage in a discussion about the benefits of starting suboxone. 45M with PPH of mood disorder vs schizoaffective disorder and chronic IVDA (heroin. cocaine), and PMH of R LE cellulitis & osteomyelitis, who presents to [facility] for worsening pain and swelling of R leg, admitted for acute-on-chronic multifocal osteomyelitis and worsening cellulitis. Psychiatry was consulted to assist with providing psychoeducation re the benefits of taking suboxone. Patient currently presents sedated and unable/unwilling to engage in a discussion about the benefits of starting suboxone, however he agrees to discuss again tomorrow.   Plan:  -will continue to meet with the patient to provide psychoeducation re the benefits of starting suboxone  -consider starting clonidine 0.1mg q12h for symptomatic management of opioid withdrawal  -continue home remeron 15mg qd, wellbutrin 150mg qd, Neurontin 300 qd, lamictal 25mg qd.

## 2021-01-06 NOTE — ED PROVIDER NOTE - MUSCULOSKELETAL, MLM
right leg 3+ edema, warm, redness, open sore lateral malleolus without drainage but lower leg erythematous, tender to touch. left leg 2+ edema, no redness/warmth. pedal pulses 1+. track marks right arm do not appear infected

## 2021-01-06 NOTE — H&P ADULT - ASSESSMENT
Mr. Ty is a 45M with schizoaffective disorder, IVDA, and hx of R LE cellulitis & osteomyelitis, who presents to [facility] for worsening pain and swelling of R leg, admitted for acute-on-chronic multifocal osteomyelitis and worsening cellulitis.

## 2021-01-06 NOTE — H&P ADULT - PROBLEM SELECTOR PLAN 4
Pt with schizoaffective disorder, on remeron 15mg qd, wellbutrin 150mg qd, Neurontin 300 qd, lamictal 25mg qd.    -C/w home medications

## 2021-01-06 NOTE — BEHAVIORAL HEALTH ASSESSMENT NOTE - HPI (INCLUDE ILLNESS QUALITY, SEVERITY, DURATION, TIMING, CONTEXT, MODIFYING FACTORS, ASSOCIATED SIGNS AND SYMPTOMS)
Patient is a 45M with PPH of mood disorder vs schizoaffective disorder, chronic IVDA (heroin. cocaine), and hx of R LE cellulitis & osteomyelitis, who presents to [facility] for worsening pain and swelling of R leg, admitted for acute-on-chronic multifocal osteomyelitis and worsening cellulitis. Psychiatry was consulted to assist with providing psychoeducation re the benefits of taking suboxone. Per the primary team, patient expressed ambivalence about starting suboxone due to having side effects with prior trials and expressed wanting to try methadone which could be a barrier for finding an adequate long term facility to continue his IV antibiotic treatment for osteomyelitis. Patient is a 45M with PPH of mood disorder vs schizoaffective disorder, chronic IVDA (heroin. cocaine), and hx of R LE cellulitis & osteomyelitis, who presents to [facility] for worsening pain and swelling of R leg, admitted for acute-on-chronic multifocal osteomyelitis and worsening cellulitis. Psychiatry was consulted to assist with providing psychoeducation re the benefits of taking suboxone. Per the primary team, patient expressed ambivalence about starting suboxone due to having side effects with prior trials and expressed wanting to try methadone which could be a barrier for finding an adequate long term facility to continue his IV antibiotic treatment for osteomyelitis. Patient was seen at bedside. He presented asleep, difficult to wake up. He reported that his mood is "tired". He reported that he is not currently interested in starting or discussing suboxone. He reported that he was treated in the past with clonidine with good effects. No current SI/HI/AVH/Pi expressed.   Per chart, patient follows with NP Tresa Márquez at [facility]. Patient is a 45M with PPH of mood disorder vs schizoaffective disorder, chronic IVDA (heroin. cocaine), and hx of R LE cellulitis & osteomyelitis, who presents to [facility] for worsening pain and swelling of R leg, admitted for acute-on-chronic multifocal osteomyelitis and worsening cellulitis. Psychiatry was consulted to assist with providing psychoeducation re the benefits of taking suboxone. Per the primary team, patient expressed ambivalence about starting suboxone due to having side effects with prior trials and expressed wanting to try methadone which could be a barrier for finding an adequate long term facility to continue his IV antibiotic treatment for osteomyelitis. Patient was seen at bedside. He presented asleep, difficult to wake up. He reported that his mood is "tired". He reported that he is not currently interested in starting or discussing suboxone. He reported that he was treated in the past with clonidine with good effects. No current SI/HI/AVH/Pi expressed.   Per chart, patient follows with NP Tresa Flores at [facility] and he is treated with remeron 15mg qd, wellbutrin 150mg qd, Neurontin 300 qd, lamictal 25mg qd.  Per chart, patient has history of leaving AMA.

## 2021-01-06 NOTE — H&P ADULT - PROBLEM SELECTOR PLAN 1
-Pt with recent admission to [facility] 12/11/20-12/16/20 with foot abscess/osteo, s/p I&D 12/11. MRI: osteo involving the distal tibia, distal fibula, talus, articular aspect and anterior third of the calcaneum, navicular, articular aspects of the cuboid, medial, intermediate and lateral cuneiforms, with pathological fracture about the talar dome, and large joint effusion with enhancing peripheral synovitis. Initially on vancomycin for S. aureus, switched to clindamycin after sensitivities resulted. Pt signed out AMA and was discharged on doxycycline, however he stopped 4d into course due to diarrhea.  -Physical exam today, RLE: erythematous and warm to touch with nonpitting edema to knee. 2 ulcerated lesions on R shin with dried purulence (not actively draining). 1 ulcerated lesion on dorsal aspect of R foot with dried purulence (not actively draining). 1 2cm laceration on lateral malleolus (?previous I&D), no drainage. Sensation intact throughout leg. Pt able to wiggle toes. Unable to palpate pulse due to severity of edema however capillary refill intact.  -S/p vancomycin, cefepime in ED  -ESR 89, CRP 9.88  Plan:    -c/w vancomycin 1250mg q12hrs for MRSA coverage. Monitor renal function and obtain trough prior to 4th dose    -c/w cefepime 2mg qd    -f/u blood cultures obtained in ED (1/6)    -f/u MRI to evaluate for worsening cellulitis/osteo    -appreciate podiatry recommendations

## 2021-01-06 NOTE — CONSULT NOTE ADULT - ASSESSMENT
Mr. Ty is a 45M with schizoaffective disorder, IVDA, and hx of R LE cellulitis & osteomyelitis, who presents to [facility] for worsening pain and swelling of R leg. Extensive osteomyelitis noted on MRI done at [facility]: distal tibia, distal fibula, talus, articular aspect and anterior third of the calcaneum, navicular, articular aspects of the cuboid, medial, intermediate and lateral cuneiforms.    P:  -IV abx  -R foot MRI w/ & w/o contrast   Mr. Ty is a 45M with schizoaffective disorder, IVDA, and hx of R LE cellulitis & osteomyelitis, who presents to [facility] for worsening pain and swelling of R leg. Extensive osteomyelitis noted on MRI done at [facility]: distal tibia, distal fibula, talus, articular aspect and anterior third of the calcaneum, navicular, articular aspects of the cuboid, medial, intermediate and lateral cuneiforms.    P:  -No palpable fluctuance at this time, but difficult to identify due to significant edema. Will need a new MRI to identify if there is a new drainable collection and to evaluate the current extent of osteomyelitis.  -IV abx  -MRI, R foot & ankle, w/ & w/o IV contrast  -Discussed with patient about treatment options for osteomyelitis: amputation vs long term abx. Pt electing antibiotics at this time with the understanding that an amputation would provide the most definitive treatment.  -Plan d/w Dr Thorpe

## 2021-01-06 NOTE — H&P ADULT - HISTORY OF PRESENT ILLNESS
Mr. Ty is a 45M with schizoaffective disorder, IVDA, and hx of R LE cellulitis & osteomyelitis, who presents to [facility] for worsening pain and swelling of R leg.     Pt with recent admission to [facility] 12/11/20-12/16/20 with foot abscess/osteo, s/p I&D 12/11. MRI w/ osteo of distal tibia/fibula/talus/foot bones. Initially on vancomycin for S. aureus, switched to clindamycin, and discharged on doxycycline.     [Per documentation, pt then signed out ama. Was discharged on doxycycline but he says he was taking Clindamycin po. Not tolerating due to diarrhea so he stopped after a few days and hasn't taken anything in a few weeks. No diarrhea in past few weeks. Supposed to followup 12/29 but didn't. Last used iv drug yesterday.]    In the ED,  Vitals: T98.9, HR 75, /69, RR 18, O2sat 96%  Labs: Hb 8.3 | Na 133, Cl 92, total protein 9.1 | alk phos 134, AST 72, ALT 91 | ESR 89, CRP 9.88 | lactate 1.6    UA: large blood, RBC 5-10  Imaging:    Interventions: given Tylenol 1g qd, toradol 30mg, vancomycin 1250mg, cefepime 2g.     Mr. Ty is a 45M with schizoaffective disorder, IVDA, and hx of R LE cellulitis & osteomyelitis, who presents to [facility] for worsening pain and swelling of R leg.     Pt with recent admission to [facility] 12/11/20-12/16/20 with foot abscess/osteo, s/p I&D 12/11. MRI w/ osteo involving the distal tibia, distal fibula, talus, articular aspect and anterior third of the calcaneum, navicular, articular aspects of the cuboid, medial, intermediate and lateral cuneiforms, with pathological fracture about the talar dome, and large joint effusion with enhancing peripheral synovitis. Initially on vancomycin for S. aureus, switched to clindamycin after sensitivities resulted. Pt signed out AMA and was discharged on doxycycline, however he stopped 4d into course due to diarrhea. Pt was supposed to follow up 12/29 but didn't arrive at appointment.    Today he reports coming in due to worsening pain and swelling of both feet. He reports that the swelling worsened in the L foot due to increased pressure and weight on that side while using crutches.    Of note, pt with current IVDA. Last used IV heroine and cocaine yesterday. Uses same distributor, obtains sterile needles, does not share needles.    Today   In the ED,  Vitals: T98.9, HR 75, /69, RR 18, O2sat 96%  Labs: Hb 8.3 | Na 133, Cl 92, total protein 9.1 | alk phos 134, AST 72, ALT 91 | ESR 89, CRP 9.88 | lactate 1.6    UA: large blood, RBC 5-10  Imaging:    CXR: Heart, lungs, mediastinum and thorax are unremarkable.    XR Tibia + Fibula: Tibia and fibula are unremarkable. Ankle and foot soft tissue swelling, ankle joint effusion, talar pathological fracture/osteomyelitis    XR Foot AP + Lateral + Oblique, Right: Diffuse soft tissue swelling, demineralization, anklejoint effusion.. Talar pathological fracture/osteomyelitis.    US Duplex Venous Lower Ext Complete, Bilateral: neg for DVT  Interventions: given Tylenol 1g qd, toradol 30mg, vancomycin 1250mg, cefepime 2g     Mr. Ty is a 45M with schizoaffective disorder, IVDA, and hx of R LE cellulitis & osteomyelitis, who presents to [facility] for worsening pain and swelling of R leg.     Pt with recent admission to [facility] 12/11/20-12/16/20 with foot abscess/osteo, s/p I&D 12/11. MRI w/ osteo involving the distal tibia, distal fibula, talus, articular aspect and anterior third of the calcaneum, navicular, articular aspects of the cuboid, medial, intermediate and lateral cuneiforms, with pathological fracture about the talar dome, and large joint effusion with enhancing peripheral synovitis. Initially on vancomycin for S. aureus, switched to clindamycin after sensitivities resulted. Pt signed out AMA and was discharged on doxycycline, however he stopped 4d into course due to diarrhea. Pt was supposed to follow up 12/29 but didn't arrive at appointment.    Today he reports coming in due to worsening pain and swelling of both feet. He reports that the swelling worsened in the L foot due to increased pressure and weight on that side while using crutches.    Of note, pt with current IVDA. Last used IV heroine and cocaine yesterday. Uses same distributor, obtains sterile needles, does not share needles.    Today   In the ED,  Vitals: T98.9, HR 75, /69, RR 18, O2sat 96%  Labs: Hb 8.3 | Na 133, Cl 92, total protein 9.1 | alk phos 134, AST 72, ALT 91 | ESR 89, CRP 9.88 | lactate 1.6    UA: large blood, RBC 5-10  Imaging:    CXR: Heart, lungs, mediastinum and thorax are unremarkable.    XR Tibia + Fibula: Tibia and fibula are unremarkable. Ankle and foot soft tissue swelling, ankle joint effusion, talar pathological fracture/osteomyelitis    XR Foot AP + Lateral + Oblique, Right: Diffuse soft tissue swelling, demineralization, anklejoint effusion.. Talar pathological fracture/osteomyelitis.    US Duplex Venous Lower Ext Complete, Bilateral: neg for DVT  Interventions: given Tylenol 1g qd, toradol 30mg, vancomycin 1250mg, cefepime 2g

## 2021-01-06 NOTE — CONSULT NOTE ADULT - SUBJECTIVE AND OBJECTIVE BOX
HPI:  Mr. Ty is a 45M with schizoaffective disorder, IVDA, and hx of R LE cellulitis & osteomyelitis, who presents to [facility] for worsening pain and swelling of R leg.     Pt with recent admission to [facility] 20-20 with foot abscess/osteo, s/p I&D . MRI w/ osteo involving the distal tibia, distal fibula, talus, articular aspect and anterior third of the calcaneum, navicular, articular aspects of the cuboid, medial, intermediate and lateral cuneiforms, with pathological fracture about the talar dome, and large joint effusion with enhancing peripheral synovitis. Initially on vancomycin for S. aureus, switched to clindamycin after sensitivities resulted. Pt signed out AMA and was discharged on doxycycline, however he stopped 4d into course due to diarrhea. Pt was supposed to follow up  but didn't arrive at appointment.    Today he reports coming in due to worsening pain and swelling of both feet. He reports that the swelling worsened in the L foot due to increased pressure and weight on that side while using crutches.    Of note, pt with current IVDA. Last used IV heroine and cocaine yesterday. Uses same distributor, obtains sterile needles, does not share needles.    Today   In the ED,  Vitals: T98.9, HR 75, /69, RR 18, O2sat 96%  Labs: Hb 8.3 | Na 133, Cl 92, total protein 9.1 | alk phos 134, AST 72, ALT 91 | ESR 89, CRP 9.88 | lactate 1.6    UA: large blood, RBC 5-10  Imaging:    CXR: Heart, lungs, mediastinum and thorax are unremarkable.    XR Tibia + Fibula: Tibia and fibula are unremarkable. Ankle and foot soft tissue swelling, ankle joint effusion, talar pathological fracture/osteomyelitis    XR Foot AP + Lateral + Oblique, Right: Diffuse soft tissue swelling, demineralization, anklejoint effusion.. Talar pathological fracture/osteomyelitis.    US Duplex Venous Lower Ext Complete, Bilateral: neg for DVT  Interventions: given Tylenol 1g qd, toradol 30mg, vancomycin 1250mg, cefepime 2g (2021 05:34)      PAST MEDICAL & SURGICAL HISTORY:  IV drug abuse    Schizoaffective disorder    No significant past surgical history          REVIEW OF SYSTEMS:    General:	 no weakness; no fevers, no chills  Skin/Breast: no rash  Respiratory and Thorax: no SOB, no cough  Cardiovascular:	No chest pain  Gastrointestinal:	 no nausea, vomiting , diarrhea  Genitourinary:	no dysuria, no difficulty urinating, no hematuria  Musculoskeletal:	no weakness, no joint swelling/pain  Neurological:	no focal weakness/numbness  Endocrine:	no polyuria, no polydipsia      ANTIBIOTICS:  MEDICATIONS  (STANDING):  buPROPion XL . 150 milliGRAM(s) Oral daily  cefepime   IVPB 2000 milliGRAM(s) IV Intermittent every 8 hours  cloNIDine 0.1 milliGRAM(s) Oral every 12 hours  enoxaparin Injectable 40 milliGRAM(s) SubCutaneous every 24 hours  gabapentin 300 milliGRAM(s) Oral daily  lamoTRIgine 25 milliGRAM(s) Oral daily  vancomycin  IVPB 1250 milliGRAM(s) IV Intermittent every 8 hours    MEDICATIONS  (PRN):  acetaminophen   Tablet .. 650 milliGRAM(s) Oral every 6 hours PRN Temp greater or equal to 38C (100.4F), Mild Pain (1 - 3)  cloNIDine 0.1 milliGRAM(s) Oral once PRN withdrawal symptoms      Allergies    Haldol (Unknown)  penicillin (Unknown)  Seafood (Unknown)    Intolerances        SOCIAL HISTORY:    FAMILY HISTORY:      Vital Signs Last 24 Hrs  T(C): 37 (2021 07:20), Max: 37.2 (2021 00:05)  T(F): 98.6 (2021 07:20), Max: 98.9 (2021 00:05)  HR: 80 (2021 07:20) (75 - 89)  BP: 121/55 (2021 07:20) (114/64 - 157/69)  BP(mean): --  RR: 18 (2021 07:20) (15 - 18)  SpO2: 99% (2021 07:20) (96% - 99%)    PHYSICAL EXAM:  Constitutional:Well-developed, well nourished  Eyes:LUZ MARIA, EOMI  Ear/Nose/Throat: no oral lesion, no sinus tenderness on percussion	  Neck:no JVD, no lymphadenopathy, supple  Respiratory: CTA anne  Cardiovascular: S1S2 RRR, no murmurs  Gastrointestinal:soft, (+) BS, no HSM  Extremities: marked RLE edema and tenderness; slight warmth  Vascular: DP Pulse:	right normal; left normal            LABS:                        8.8    9.16  )-----------( 468      ( 2021 11:10 )             29.0     -    135  |  97  |  9   ----------------------------<  79  3.9   |  29  |  0.80    Ca    8.8      2021 11:10  Phos  3.4     -  Mg     1.5         TPro  9.1<H>  /  Alb  3.0<L>  /  TBili  0.8  /  DBili  x   /  AST  74<H>  /  ALT  86<H>  /  AlkPhos  127<H>      PT/INR - ( 2021 01:22 )   PT: 14.5 sec;   INR: 1.22          PTT - ( 2021 01:22 )  PTT:39.2 sec  Urinalysis Basic - ( 2021 03:13 )    Color: Yellow / Appearance: Clear / S.025 / pH: x  Gluc: x / Ketone: NEGATIVE  / Bili: Negative / Urobili: 0.2 E.U./dL   Blood: x / Protein: Trace mg/dL / Nitrite: NEGATIVE   Leuk Esterase: NEGATIVE / RBC: 5-10 /HPF / WBC < 5 /HPF   Sq Epi: x / Non Sq Epi: 0-5 /HPF / Bacteria: Present /HPF        MICROBIOLOGY: OSH wound cx RLE  MSSA; bcx ng  RADIOLOGY & ADDITIONAL STUDIES: reviewed

## 2021-01-06 NOTE — H&P ADULT - NSHPOUTPATIENTPROVIDERS_GEN_ALL_CORE
Tresa Miles - NP at [facility] (mental health clinic)  Dr. Demetra De La Garza - PCP

## 2021-01-06 NOTE — BEHAVIORAL HEALTH ASSESSMENT NOTE - RISK ASSESSMENT
No acute risk at this time: no SI; future oriented thinking, motivation to engage in treatment  has chronic risk due to chronic use of substances and  hx mood disorder Low Acute Suicide Risk

## 2021-01-06 NOTE — ED ADULT TRIAGE NOTE - OTHER COMPLAINTS
reports hx of R ankle fx and "infection". Recommended to be on Clindamycin PO but pt reports unable to tolerate clindamycin

## 2021-01-06 NOTE — H&P ADULT - PROBLEM SELECTOR PLAN 5
normal...
F: none indicated, encourage PO  E: replete to K>4, Mg>2, Phos>2.5  N: regular diet    Ppx: lovenox 40mg subcut    Code status: full code    Dispo: [email or web address]

## 2021-01-07 LAB
ALBUMIN SERPL ELPH-MCNC: 2.5 G/DL — LOW (ref 3.3–5)
ALP SERPL-CCNC: 97 U/L — SIGNIFICANT CHANGE UP (ref 40–120)
ALT FLD-CCNC: 80 U/L — HIGH (ref 10–45)
ANION GAP SERPL CALC-SCNC: 9 MMOL/L — SIGNIFICANT CHANGE UP (ref 5–17)
AST SERPL-CCNC: 78 U/L — HIGH (ref 10–40)
BILIRUB SERPL-MCNC: 0.7 MG/DL — SIGNIFICANT CHANGE UP (ref 0.2–1.2)
BUN SERPL-MCNC: 6 MG/DL — LOW (ref 7–23)
CALCIUM SERPL-MCNC: 8.3 MG/DL — LOW (ref 8.4–10.5)
CHLORIDE SERPL-SCNC: 100 MMOL/L — SIGNIFICANT CHANGE UP (ref 96–108)
CO2 SERPL-SCNC: 28 MMOL/L — SIGNIFICANT CHANGE UP (ref 22–31)
CREAT SERPL-MCNC: 0.71 MG/DL — SIGNIFICANT CHANGE UP (ref 0.5–1.3)
CULTURE RESULTS: NO GROWTH — SIGNIFICANT CHANGE UP
GLUCOSE SERPL-MCNC: 131 MG/DL — HIGH (ref 70–99)
HCT VFR BLD CALC: 26.5 % — LOW (ref 39–50)
HGB BLD-MCNC: 7.8 G/DL — LOW (ref 13–17)
MAGNESIUM SERPL-MCNC: 1.5 MG/DL — LOW (ref 1.6–2.6)
MCHC RBC-ENTMCNC: 25.2 PG — LOW (ref 27–34)
MCHC RBC-ENTMCNC: 29.4 GM/DL — LOW (ref 32–36)
MCV RBC AUTO: 85.5 FL — SIGNIFICANT CHANGE UP (ref 80–100)
NRBC # BLD: 0 /100 WBCS — SIGNIFICANT CHANGE UP (ref 0–0)
PHOSPHATE SERPL-MCNC: 3 MG/DL — SIGNIFICANT CHANGE UP (ref 2.5–4.5)
PLATELET # BLD AUTO: 411 K/UL — HIGH (ref 150–400)
POTASSIUM SERPL-MCNC: 3.7 MMOL/L — SIGNIFICANT CHANGE UP (ref 3.5–5.3)
POTASSIUM SERPL-SCNC: 3.7 MMOL/L — SIGNIFICANT CHANGE UP (ref 3.5–5.3)
PROT SERPL-MCNC: 7.7 G/DL — SIGNIFICANT CHANGE UP (ref 6–8.3)
RBC # BLD: 3.1 M/UL — LOW (ref 4.2–5.8)
RBC # FLD: 18.6 % — HIGH (ref 10.3–14.5)
SODIUM SERPL-SCNC: 137 MMOL/L — SIGNIFICANT CHANGE UP (ref 135–145)
SPECIMEN SOURCE: SIGNIFICANT CHANGE UP
WBC # BLD: 6.89 K/UL — SIGNIFICANT CHANGE UP (ref 3.8–10.5)
WBC # FLD AUTO: 6.89 K/UL — SIGNIFICANT CHANGE UP (ref 3.8–10.5)

## 2021-01-07 PROCEDURE — 99232 SBSQ HOSP IP/OBS MODERATE 35: CPT

## 2021-01-07 PROCEDURE — 99231 SBSQ HOSP IP/OBS SF/LOW 25: CPT

## 2021-01-07 PROCEDURE — 99233 SBSQ HOSP IP/OBS HIGH 50: CPT | Mod: GC

## 2021-01-07 RX ORDER — METHADONE HYDROCHLORIDE 40 MG/1
10 TABLET ORAL EVERY 6 HOURS
Refills: 0 | Status: DISCONTINUED | OUTPATIENT
Start: 2021-01-07 | End: 2021-01-07

## 2021-01-07 RX ADMIN — Medication 650 MILLIGRAM(S): at 09:19

## 2021-01-07 RX ADMIN — Medication 100 MILLIGRAM(S): at 09:22

## 2021-01-07 NOTE — PROGRESS NOTE ADULT - PROBLEM SELECTOR PLAN 5
F: none indicated, encourage PO  E: replete to K>4, Mg>2, Phos>2.5  N: regular diet    Ppx: lovenox 40mg subcut    Code status: full code    Dispo: [email or web address]

## 2021-01-07 NOTE — PROGRESS NOTE ADULT - PROBLEM SELECTOR PLAN 1
#osteomyelitis w/ overlying cellulitis of RLE   -Pt with recent admission to [facility] 12/11/20-12/16/20 with foot abscess/osteo, s/p I&D 12/11. MRI: osteo involving the distal tibia, distal fibula, talus, articular aspect and anterior third of the calcaneum, navicular, articular aspects of the cuboid, medial, intermediate and lateral cuneiforms, with pathological fracture about the talar dome, and large joint effusion with enhancing peripheral synovitis. Initially on vancomycin for S. aureus, switched to clindamycin after sensitivities resulted. Pt signed out AMA and was discharged on doxycycline, however he stopped 4d into course due to diarrhea.  -Physical exam today, RLE: erythematous and warm to touch with nonpitting edema to knee. 2 ulcerated lesions on R shin with dried purulence (not actively draining). 1 ulcerated lesion on dorsal aspect of R foot with dried purulence (not actively draining). 1 2cm laceration on lateral malleolus (?previous I&D), no drainage. Sensation intact throughout leg. Pt able to wiggle toes. Unable to palpate pulse due to severity of edema however capillary refill intact.  -S/p vancomycin, cefepime in ED  -abx switched to Cefazolin 2g IV per ID on 1/6, will need possible diagnostic arthrocentesis  -Pending MRI    -f/u blood cultures obtained on admission (1/6), currently NGTD    -f/u MRI to evaluate for worsening cellulitis/osteo    -appreciate podiatry recommendations

## 2021-01-07 NOTE — CHART NOTE - NSCHARTNOTEFT_GEN_A_CORE
Elopement Note    Patient eloped from [facility] on 1/7/2020 without providing an opportunity for counseling, though many previous attempts were made to communicate to patient the need for IV antibiotics and possible intervention for his cellulitis and osteomyelitis, as well as the risks of noncompliance with treatment. An attempt was made to contact the patient's listed contact number without success to provide further counseling and prescription information for PO antibiotics; will attempt again later today.

## 2021-01-07 NOTE — PROGRESS NOTE ADULT - PROBLEM SELECTOR PLAN 4
Pt with schizoaffective disorder, on remeron 15mg qd, wellbutrin 150mg qd, Neurontin 300 qd, lamictal 50mg daily.    -C/w home medications

## 2021-01-07 NOTE — PROGRESS NOTE ADULT - SUBJECTIVE AND OBJECTIVE BOX
INTERVAL HPI/OVERNIGHT EVENTS:    Patient was seen and examined at bedside.  Reports inadequate pain control.      CONSTITUTIONAL:  Negative fever or chills, feels sick, good appetite  EYES:  Negative  blurry vision or double vision  CARDIOVASCULAR:  Negative for chest pain or palpitations  RESPIRATORY:  Negative for cough, wheezing, or SOB   GASTROINTESTINAL:  Negative for nausea, vomiting, diarrhea, constipation, or abdominal pain  GENITOURINARY:  Negative frequency, urgency or dysuria  NEUROLOGIC:  No headache, confusion, dizziness, lightheadedness      ANTIBIOTICS/RELEVANT:    MEDICATIONS  (STANDING):  buPROPion XL . 150 milliGRAM(s) Oral daily  ceFAZolin   IVPB 2000 milliGRAM(s) IV Intermittent every 8 hours  cloNIDine 0.1 milliGRAM(s) Oral every 12 hours  enoxaparin Injectable 40 milliGRAM(s) SubCutaneous every 24 hours  gabapentin 300 milliGRAM(s) Oral daily  lamoTRIgine 50 milliGRAM(s) Oral daily  mirtazapine 15 milliGRAM(s) Oral at bedtime    MEDICATIONS  (PRN):  acetaminophen   Tablet .. 650 milliGRAM(s) Oral every 6 hours PRN Temp greater or equal to 38C (100.4F), Mild Pain (1 - 3)  cloNIDine 0.1 milliGRAM(s) Oral once PRN withdrawal symptoms  methadone    Tablet 10 milliGRAM(s) Oral every 6 hours PRN withdrawal symptoms        Vital Signs Last 24 Hrs  T(C): 37.7 (2021 22:00), Max: 38.4 (2021 20:32)  T(F): 99.9 (2021 22:00), Max: 101.1 (2021 20:32)  HR: 96 (2021 20:32) (96 - 96)  BP: 110/68 (2021 20:32) (110/68 - 110/68)  BP(mean): --  RR: 18 (2021 20:32) (18 - 18)  SpO2: 97% (2021 20:32) (97% - 97%)    PHYSICAL EXAM:  Constitutional:  non-toxic, no distress  Eyes:  no icterus   Ear/Nose/Throat: no oral lesion  Neck:  supple  Respiratory: CTA anne  Cardiovascular: S1S2 RRR, no murmurs  Gastrointestinal:soft, (+) BS, no HSM  Extremities:  right foot with swelling, erythema, no discharge, warm to touch   Vascular: DP Pulse:	right normal; left normal      LABS:                        7.8    6.89  )-----------( 411      ( 2021 11:06 )             26.5     01    137  |  100  |  6<L>  ----------------------------<  131<H>  3.7   |  28  |  0.71    Ca    8.3<L>      2021 11:06  Phos  3.0       Mg     1.5         TPro  7.7  /  Alb  2.5<L>  /  TBili  0.7  /  DBili  x   /  AST  78<H>  /  ALT  80<H>  /  AlkPhos  97  07    PT/INR - ( 2021 01:22 )   PT: 14.5 sec;   INR: 1.22          PTT - ( 2021 01:22 )  PTT:39.2 sec  Urinalysis Basic - ( 2021 03:13 )    Color: Yellow / Appearance: Clear / S.025 / pH: x  Gluc: x / Ketone: NEGATIVE  / Bili: Negative / Urobili: 0.2 E.U./dL   Blood: x / Protein: Trace mg/dL / Nitrite: NEGATIVE   Leuk Esterase: NEGATIVE / RBC: 5-10 /HPF / WBC < 5 /HPF   Sq Epi: x / Non Sq Epi: 0-5 /HPF / Bacteria: Present /HPF        MICROBIOLOGY:    Culture - Blood (21 @ 10:29)    Specimen Source: .Blood Blood-Peripheral    Culture Results:   No growth at 1 day.    Culture - Blood (21 @ 10:29)    Specimen Source: .Blood Blood-Peripheral    Culture Results:   No growth at 1 day.        RADIOLOGY & ADDITIONAL STUDIES:    < from: Xray Chest 1 View AP/PA (21 @ 04:56) >  Findings/  impression: Heart, lungs, mediastinum and thorax are unremarkable.    < end of copied text >  
Ortho Note    denies increase in pain but no improvement either.   Denies CP, SOB, N/V, numbness/tingling     Vital Signs Last 24 Hrs  T(C): 37.7 (06 Jan 2021 22:00), Max: 38.4 (06 Jan 2021 20:32)  T(F): 99.9 (06 Jan 2021 22:00), Max: 101.1 (06 Jan 2021 20:32)  HR: 96 (06 Jan 2021 20:32) (89 - 96)  BP: 110/68 (06 Jan 2021 20:32) (110/68 - 112/64)  BP(mean): --  RR: 18 (06 Jan 2021 20:32) (17 - 18)  SpO2: 97% (06 Jan 2021 20:32) (97% - 98%)    General: Pt Alert and oriented, NAD  stable erythema, swelling of ankle  lateral site of I&D w no expressible purulence or drainage  Pulses: feet wwp   Sensation: SILT  Motor: firing EHL/FHL/TA/GS                          8.8    9.16  )-----------( 468      ( 06 Jan 2021 11:10 )             29.0   06 Jan 2021 11:10    135    |  97     |  9      ----------------------------<  79     3.9     |  29     |  0.80     Phos  3.4       06 Jan 2021 11:10  Mg     1.5       06 Jan 2021 11:10    TPro  9.1    /  Alb  3.0    /  TBili  0.8    /  DBili  x      /  AST  74     /  ALT  86     /  AlkPhos  127    06 Jan 2021 11:10     45yMale w a complex history as well as noted/documented non-compliance with treatment presenting after R ankle/foot I&D at an outside hospital w continued pain, swelling and erythema non-compliant w prescribed abx admitted to medicine for cellulitis. Podiatry and ID on following recommending MR of ankle/foot to evaluate soft tissues and bones further as well as possible abscess. Agree with podiatry, will follow up MR. Due to non-septic/unstable status of patient, Erythema throughout region where aspiration would occur and abx treatment aspiration will be deferred pending MR results. Will continue to follow, Podiatry recs appropriate. If any changes in status of pt please notify ortho.   - No acute orthopaedic intervention at this point  - Podiatry recs appropriate at this time, will follow behind podiatry - podiatry recommending amp?  - ID recs for Abx  - MR w and w/o contrast of distal leg, ankle and foot.   - pain control  - rest of care as per primary   - will re-eval for aspiration pending MR, or change in status.   - please notify ortho with any new information/ change in status 
Patient is a 45y old  Male who presents with a chief complaint of RLE osteomyelitis (07 Jan 2021 09:33)    INTERVAL HPI/ OVERNIGHT EVENTS: Pt seen and examined bedside    LABS                      8.8    9.16  )-----------( 468      ( 06 Jan 2021 11:10 )             29.0     01-06    135  |  97  |  9   ----------------------------<  79  3.9   |  29  |  0.80    Ca    8.8      06 Jan 2021 11:10  Phos  3.4     01-06  Mg     1.5     01-06    TPro  9.1<H>  /  Alb  3.0<L>  /  TBili  0.8  /  DBili  x   /  AST  74<H>  /  ALT  86<H>  /  AlkPhos  127<H>  01-06    PT/INR - ( 06 Jan 2021 01:22 )   PT: 14.5 sec;   INR: 1.22          PTT - ( 06 Jan 2021 01:22 )  PTT:39.2 sec    ICU Vital Signs Last 24 Hrs  T(C): 37.7 (06 Jan 2021 22:00), Max: 38.4 (06 Jan 2021 20:32)  T(F): 99.9 (06 Jan 2021 22:00), Max: 101.1 (06 Jan 2021 20:32)  HR: 96 (06 Jan 2021 20:32) (89 - 96)  BP: 110/68 (06 Jan 2021 20:32) (110/68 - 112/64)  BP(mean): --  ABP: --  ABP(mean): --  RR: 18 (06 Jan 2021 20:32) (17 - 18)  SpO2: 97% (06 Jan 2021 20:32) (97% - 98%)    Lower Extremity Focused:  Vasc: DP/PT 2/4 B/L  Derm: R lateral ankle wound (1x1cm). No purulence or palpable fluctuance noted. Significant edema of RLE>>LLE.  Neuro: Sensation grossly intact    
OVERNIGHT EVENTS: Patient was febrile to 101.1, given tylenol as blood cultures pending. He refused his AM labs, cefazolin, and clonidine.     SUBJECTIVE / INTERVAL HPI: Patient seen and examined at bedside. He reports that he does not want to take his medications because noises from the IV machine annoy him. I explained the necessity of IV antibiotics as part of his treatment and he states that he will try again around 9am. He notes that he refused his clonidine because "it doesn't work."     VITAL SIGNS:  Vital Signs Last 24 Hrs  T(C): 37.7 (2021 22:00), Max: 38.4 (2021 20:32)  T(F): 99.9 (2021 22:00), Max: 101.1 (2021 20:32)  HR: 96 (2021 20:32) (89 - 96)  BP: 110/68 (2021 20:32) (110/68 - 112/64)  BP(mean): --  RR: 18 (2021 20:32) (17 - 18)  SpO2: 97% (2021 20:32) (97% - 98%)    PHYSICAL EXAM:    Constitutional: thin male in NAD, more awake/alert this AM   Neurologic: AAOx3  HEENT: NC/AT; PERRL, EOMI, anicteric sclera; no nasal discharge; no oropharyngeal erythema or exudates, MMM  Neck: supple; no JVD or thyromegaly, no cervical, post-auricular or supraclavicular lymphadenopathy  Respiratory: CTA B/L, no wheezes/rales/rhonchi, no diminished breath sounds, no increased work of breathing or accessory muscle use  Cardiac: +S1/S2, no murmurs/rubs/gallops, RRR  Gastrointestinal: abdomen soft, NT/ND; no rebound or guarding, no hepatosplenomegaly; +BSx4  Back: spine midline, no bony tenderness or step-offs  Extremities:     -RLE: erythematous and warm to touch with nonpitting edema to knee. 2 ulcerated lesions on R shin with dried purulence (not actively draining). 1 ulcerated lesion on dorsal aspect of R foot with dried purulence (not actively draining). 1 2cm laceration on lateral malleolus (?previous I&D), no drainage. Sensation intact throughout leg. Pt able to wiggle toes. Unable to palpate pulse due to severity of edema however capillary refill intact.    -LLE: nonpitting pedal edema without erythema or warmth. Unable to palpate pulse due to severity of edema however capillary refill intact.    -RUE: two sclerosed AC veins from IVDA. well-healed scars on wrist    -LUE: well-healed scars on wrist  Psychiatric: affect and characteristics of appearance, verbalizations, behaviors are appropriate    MEDICATIONS:  MEDICATIONS  (STANDING):  buPROPion XL . 150 milliGRAM(s) Oral daily  ceFAZolin   IVPB 2000 milliGRAM(s) IV Intermittent every 8 hours  cloNIDine 0.1 milliGRAM(s) Oral every 12 hours  enoxaparin Injectable 40 milliGRAM(s) SubCutaneous every 24 hours  gabapentin 300 milliGRAM(s) Oral daily  lamoTRIgine 50 milliGRAM(s) Oral daily  mirtazapine 15 milliGRAM(s) Oral at bedtime    MEDICATIONS  (PRN):  acetaminophen   Tablet .. 650 milliGRAM(s) Oral every 6 hours PRN Temp greater or equal to 38C (100.4F), Mild Pain (1 - 3)  cloNIDine 0.1 milliGRAM(s) Oral once PRN withdrawal symptoms      ALLERGIES:  Allergies    Haldol (Unknown)  penicillin (Unknown)  Seafood (Unknown)    Intolerances        LABS:                        8.8    9.16  )-----------( 468      ( 2021 11:10 )             29.0     -    135  |  97  |  9   ----------------------------<  79  3.9   |  29  |  0.80    Ca    8.8      2021 11:10  Phos  3.4     -  Mg     1.5     -    TPro  9.1<H>  /  Alb  3.0<L>  /  TBili  0.8  /  DBili  x   /  AST  74<H>  /  ALT  86<H>  /  AlkPhos  127<H>  -    PT/INR - ( 2021 01:22 )   PT: 14.5 sec;   INR: 1.22          PTT - ( 2021 01:22 )  PTT:39.2 sec  Urinalysis Basic - ( 2021 03:13 )    Color: Yellow / Appearance: Clear / S.025 / pH: x  Gluc: x / Ketone: NEGATIVE  / Bili: Negative / Urobili: 0.2 E.U./dL   Blood: x / Protein: Trace mg/dL / Nitrite: NEGATIVE   Leuk Esterase: NEGATIVE / RBC: 5-10 /HPF / WBC < 5 /HPF   Sq Epi: x / Non Sq Epi: 0-5 /HPF / Bacteria: Present /HPF      CAPILLARY BLOOD GLUCOSE          RADIOLOGY & ADDITIONAL TESTS: Reviewed.

## 2021-01-07 NOTE — PROGRESS NOTE ADULT - PROBLEM SELECTOR PLAN 3
Current IVDA. Last used IV heroine and cocaine the day before admission. Uses same distributor, obtains sterile needles, does not share needles.  -started clonidine for symptomatic relief of withdrawal symptoms but patient has been refusing  -psychiatry consulted, appreciate recommendations Current IVDA. Last used IV heroine and cocaine the day before admission. Uses same distributor, obtains sterile needles, does not share needles.  -started clonidine for symptomatic relief of withdrawal symptoms but patient has been refusing  -psychiatry consulted, appreciate recommendations  -per psych, will monitor withdrawal symptoms with COWS; if COWS> 12, can offer patient methadone 5-10mg q6h prn

## 2021-01-07 NOTE — PROGRESS NOTE ADULT - ASSESSMENT
Mr. Ty is a 45M with schizoaffective disorder, IVDA, and hx of R LE cellulitis & osteomyelitis, who presents to [facility] for worsening pain and swelling of R leg. Extensive osteomyelitis noted on MRI done at [facility]: distal tibia, distal fibula, talus, articular aspect and anterior third of the calcaneum, navicular, articular aspects of the cuboid, medial, intermediate and lateral cuneiforms.    P:  -IV abx  -MRI, R foot & ankle, w/ & w/o IV contrast  -Pt electing for Abx at this time and will evaluate if a surgical procedure is necessary based on MRI results.  -Plan d/w Dr Thorpe
IMPRESSION:  Right foot infection/celluitis.  Evaluating for OM.  He reports no change on current therapy.  Outside cultures with [facility]     Recommend:  1.  Continue Cefazolin 2 grams IV q8hrs  2.  Follow up MRI  3.  If patient goes AMA, can discharge with keflex 500 mg PO q6hrs    ID team 2 will follow
Mr. Ty is a 45M with schizoaffective disorder, IVDA, and hx of R LE cellulitis & osteomyelitis, who presents to [facility] for worsening pain and swelling of R leg, admitted for acute-on-chronic multifocal osteomyelitis and worsening cellulitis.

## 2021-01-07 NOTE — PROGRESS NOTE BEHAVIORAL HEALTH - NSBHFUPINTERVALHXFT_PSY_A_CORE
Patient seen at bedside. He endorses opioid withdrawal symptoms that started yesterday evening (lacrimation, body aches). States that he would only accept methadone for his symptoms of withdrawal and does not want to try suboxone (doesn't like how it makes him feel). Denies other symptoms including SI/HI but presents slightly irritable. States that he has been [facility] but does not want to discuss his mental health treatment at this time.  Chart review shows that patient has been refusing to take clonidine and refused to allow blood work done this morning.

## 2021-01-07 NOTE — PROGRESS NOTE ADULT - ATTENDING COMMENTS
45 year old man with osteomyelitis of the right foot (diagnosed while at [facility] last month), now presenting with pain and swelling of the right foot.     # Multifocal osteomyelitis of right leg  # Cellulitis of right lower limb  MRI from [facility] stay (12/11-12/16) showing: osteomyelitis of the distal tibia, distal fibula, talus, articular aspect and anterior third of the calcaneum, navicular, articular aspects of the cuboid, medial, intermediate and lateral cuneiform  Swelling and erythema of right foot and ankle, consistent with cellulitis.   Started on abx in the ED  Agree with ID recs for Cefazolin based on sensitivities from patient’s 12/11-12/16 hospitalization at [facility]. If does not respond, may have to broaden, given that it has been >2 weeks since he left [facility]  -f/u MRI, podiatry recs, ID recs  Has effusion and swelling of the right ankle, with very limited range of motion.   - ortho following for possible septic arthritis.     # Left leg edema  Swelling without erythema. Patient attributes this to increased pressure and weight on left foot while using crutches. pain in midfoot with point tenderness in dorsum of foot around the navicular bone  -	Foot and ankle x-rays of left foot showing no fracture    # Opioid dependence   # Cocaine dependence  Last used heroin and cocaine the day before admission. Almost daily use of both  Used suboxone before but did not like taste; psych spoke with patient today, he is still reluctant to resume suboxone.     # Schizoaffective disorder  Home meds per psych recs    Evening update:   Notified in the afternoon that patient eloped from [facility].   Housestaff made a good megan attempt to contact patient. Made an attempt to contact patient about prescribing him PO abx as an alternative to IV abx for the treatment of his osteomyelitis + cellulitis, but patient did not answer phone.

## 2021-01-07 NOTE — PROGRESS NOTE BEHAVIORAL HEALTH - SUMMARY
45M with PPH of mood disorder vs schizoaffective disorder and chronic IVDA (heroin. cocaine), and PMH of R LE cellulitis & osteomyelitis, who presents to [facility] for worsening pain and swelling of R leg, admitted for acute-on-chronic multifocal osteomyelitis and worsening cellulitis. Psychiatry was consulted to assist with providing psychoeducation re the benefits of taking suboxone.   Patient currently presents with mild to moderate opioid withdrawal symptoms. He is adamant that he will not accept any other medication than methadone for his treatment.   Plan:  -although suboxone presents clear benefits, patient is currently refusing; consider monitoring withdrawal symptoms with COWS; if COWS> 12, can offer patient methadone 5-10mg q6h prn, max of 40mg/day  -continue home remeron 15mg qd, wellbutrin 150mg qd, Neurontin 300 qd, lamotrigine 25mg po daily (would not change home medications at this time as patient's symptoms are secondary to withdrawal and not underlying mood disorder).  -CL to follow as needed

## 2021-01-11 LAB
CULTURE RESULTS: SIGNIFICANT CHANGE UP
CULTURE RESULTS: SIGNIFICANT CHANGE UP
SPECIMEN SOURCE: SIGNIFICANT CHANGE UP
SPECIMEN SOURCE: SIGNIFICANT CHANGE UP

## 2021-01-12 LAB — HCV RNA FLD QL NAA+PROBE: SIGNIFICANT CHANGE UP

## 2021-01-13 DIAGNOSIS — F14.10 COCAINE ABUSE, UNCOMPLICATED: ICD-10-CM

## 2021-01-13 DIAGNOSIS — Z86.14 PERSONAL HISTORY OF METHICILLIN RESISTANT STAPHYLOCOCCUS AUREUS INFECTION: ICD-10-CM

## 2021-01-13 DIAGNOSIS — M79.89 OTHER SPECIFIED SOFT TISSUE DISORDERS: ICD-10-CM

## 2021-01-13 DIAGNOSIS — F25.9 SCHIZOAFFECTIVE DISORDER, UNSPECIFIED: ICD-10-CM

## 2021-01-13 DIAGNOSIS — F17.210 NICOTINE DEPENDENCE, CIGARETTES, UNCOMPLICATED: ICD-10-CM

## 2021-01-13 DIAGNOSIS — F11.20 OPIOID DEPENDENCE, UNCOMPLICATED: ICD-10-CM

## 2021-01-13 DIAGNOSIS — Z88.0 ALLERGY STATUS TO PENICILLIN: ICD-10-CM

## 2021-01-13 DIAGNOSIS — M65.861 OTHER SYNOVITIS AND TENOSYNOVITIS, RIGHT LOWER LEG: ICD-10-CM

## 2021-01-13 DIAGNOSIS — M84.471A PATHOLOGICAL FRACTURE, RIGHT ANKLE, INITIAL ENCOUNTER FOR FRACTURE: ICD-10-CM

## 2021-01-13 DIAGNOSIS — Z91.19 PATIENT'S NONCOMPLIANCE WITH OTHER MEDICAL TREATMENT AND REGIMEN: ICD-10-CM

## 2021-01-13 DIAGNOSIS — L97.319 NON-PRESSURE CHRONIC ULCER OF RIGHT ANKLE WITH UNSPECIFIED SEVERITY: ICD-10-CM

## 2021-01-13 DIAGNOSIS — Z88.8 ALLERGY STATUS TO OTHER DRUGS, MEDICAMENTS AND BIOLOGICAL SUBSTANCES: ICD-10-CM

## 2021-01-13 DIAGNOSIS — M25.471 EFFUSION, RIGHT ANKLE: ICD-10-CM

## 2021-01-13 DIAGNOSIS — Z53.29 PROCEDURE AND TREATMENT NOT CARRIED OUT BECAUSE OF PATIENT'S DECISION FOR OTHER REASONS: ICD-10-CM

## 2021-01-13 DIAGNOSIS — R63.8 OTHER SYMPTOMS AND SIGNS CONCERNING FOOD AND FLUID INTAKE: ICD-10-CM

## 2021-01-13 DIAGNOSIS — Z91.013 ALLERGY TO SEAFOOD: ICD-10-CM

## 2021-01-13 DIAGNOSIS — M86.171 OTHER ACUTE OSTEOMYELITIS, RIGHT ANKLE AND FOOT: ICD-10-CM

## 2021-01-13 DIAGNOSIS — L03.115 CELLULITIS OF RIGHT LOWER LIMB: ICD-10-CM

## 2022-09-24 NOTE — H&P ADULT - NSHPATTENDINGPLANDISCUSS_GEN_ALL_CORE
Dr. Grant, Dr. Salcedo, Dr. Ramirez Complex Repair And Rhombic Flap Text: The defect edges were debeveled with a #15 scalpel blade.  The primary defect was closed partially with a complex linear closure.  Given the location of the remaining defect, shape of the defect and the proximity to free margins a rhombic flap was deemed most appropriate for complete closure of the defect.  Using a sterile surgical marker, an appropriate advancement flap was drawn incorporating the defect and placing the expected incisions within the relaxed skin tension lines where possible.    The area thus outlined was incised deep to adipose tissue with a #15 scalpel blade.  The skin margins were undermined to an appropriate distance in all directions utilizing iris scissors.

## 2023-03-28 NOTE — ED PROVIDER NOTE - NS_EDPROVIDERDISPOUSERTYPE_ED_A_ED
Impression: Dry eye syndrome of bilateral lacrimal glands: H04.123.  Plan: not better stop maxitrol start prednisolone BID 2 weeks start artifical tears at least BID or more return 3 weeks
Attending Attestation (For Attendings USE Only)...

## 2024-04-14 NOTE — ED ADULT NURSE NOTE - OBJECTIVE STATEMENT
Never smoker
44 y/o male w/ PMH schizoaffective disorder, IVDA, and cellulitis presents to ED w/ c/o bilateral LE pain, RLE>LLE. hx cellulitis/abscess/osteomyelitis to RLE w/ I&D done at OSH, noncompliant w/ treatment and signed out AMA. presents today w/ worsening sx to RLE, increased swelling/redness/pain. ?fevers, denies recent diarrhea. last PIV drug use today

## 2024-04-17 NOTE — PROGRESS NOTE ADULT - PROBLEM/PLAN-1
OP RT CM called and spoke daughter Nimco ( who has signed consent to speak on her dads behalf)  regarding her fathers  breathing, medications and O2.   She reports he is doing well. HE is on 2 lpm home oxygen with POX usually in the upper 90's but does fall to 93-94% at times.   Lucho does not have excess SOB or fever. He is not coughing up mucus regularly. I did speak with Nimco about the mention of changing patient to Xopenex ( per ER not at last admission) . There is mention of it as patient HR was a little high as well as that he has history of Atrial Fibrillation . She reports he is using Albulerol in both nebulizer and rescue. She will contact [facility] pulmonary today, Dr Echols . Rebecca does have apt on 4/22 but she will outreach today to see if he should be changed over to Xopenex. She ws not aware that POX had hr on it but begin to take notice of that .Lucho also has a cardiology appointment on 4/23 which she will take him to.  Nimco is patient primary caregiver and will be having surgery in upcoming weeks. I did mention to her that we can inquire about getting father home care assistance if she will be unable to provide care. She said she will think about that and call back if she feels she needs that.   I will send out COPD booklet as well as zone tools. Nimco feels father would benefit from the books. She is also hoping to enroll Lucho in pulmonary rehab. He was supposed to go for consultation but ended up in the hospital . Once her surgery is complete she hopes to enroll him. We did go over the benefits.   OP RT CM will outreach in 2 weeks to check patient status. Nimco does have my contact phone number should she need to outreach prior.   
DISPLAY PLAN FREE TEXT

## 2024-04-27 RX ORDER — GABAPENTIN 400 MG/1
1 CAPSULE ORAL
Qty: 0 | Refills: 0 | DISCHARGE

## 2024-04-27 RX ORDER — LAMOTRIGINE 25 MG/1
2 TABLET, ORALLY DISINTEGRATING ORAL
Qty: 0 | Refills: 0 | DISCHARGE

## 2024-04-27 RX ORDER — BUPROPION HYDROCHLORIDE 150 MG/1
1 TABLET, EXTENDED RELEASE ORAL
Qty: 0 | Refills: 0 | DISCHARGE

## 2024-04-27 RX ORDER — MIRTAZAPINE 45 MG/1
1 TABLET, ORALLY DISINTEGRATING ORAL
Qty: 0 | Refills: 0 | DISCHARGE

## 2024-04-27 RX ORDER — ASCORBIC ACID 60 MG
1 TABLET,CHEWABLE ORAL
Qty: 0 | Refills: 0 | DISCHARGE

## 2024-04-27 RX ORDER — LAMOTRIGINE 25 MG/1
1 TABLET, ORALLY DISINTEGRATING ORAL
Qty: 0 | Refills: 0 | DISCHARGE

## 2024-06-13 NOTE — ED PROVIDER NOTE - EKG ADDITIONAL QUESTION - PERFORMED INDEPENDENT VISUALIZATION
"Anesthesia Transfer of Care Note    Patient: Buddy Francois    Procedure(s) Performed: Procedure(s) (LRB):  APPENDECTOMY, LAPAROSCOPIC (N/A)    Patient location: PACU    Anesthesia Type: general    Post pain: adequate analgesia    Post assessment: no apparent anesthetic complications and tolerated procedure well    Post vital signs: stable    Level of consciousness: awake and alert    Nausea/Vomiting: no nausea/vomiting    Complications: none    Transfer of care protocol was followed      Last vitals: Visit Vitals  /70 (BP Location: Right arm, Patient Position: Lying)   Pulse 81   Temp 36.5 °C (97.7 °F) (Skin)   Resp 16   Ht 6' 1" (1.854 m)   Wt 68 kg (150 lb)   SpO2 97%   BMI 19.79 kg/m²     " Yes